# Patient Record
Sex: MALE | Race: BLACK OR AFRICAN AMERICAN | NOT HISPANIC OR LATINO | Employment: PART TIME | URBAN - METROPOLITAN AREA
[De-identification: names, ages, dates, MRNs, and addresses within clinical notes are randomized per-mention and may not be internally consistent; named-entity substitution may affect disease eponyms.]

---

## 2020-05-14 PROBLEM — F10.99 ALCOHOL USE, UNSPECIFIED WITH UNSPECIFIED ALCOHOL-INDUCED DISORDER: Status: ACTIVE | Noted: 2020-05-14

## 2020-05-14 PROBLEM — G89.29 CHRONIC LOW BACK PAIN: Status: ACTIVE | Noted: 2018-06-05

## 2020-05-14 PROBLEM — M54.2 PAIN OF CERVICAL SPINE: Status: ACTIVE | Noted: 2018-06-05

## 2020-05-14 PROBLEM — F15.90 STIMULANT USE DISORDER: Status: ACTIVE | Noted: 2020-05-14

## 2020-05-14 PROBLEM — R76.8 HCV ANTIBODY POSITIVE: Status: ACTIVE | Noted: 2020-05-14

## 2020-05-14 PROBLEM — M54.50 CHRONIC LOW BACK PAIN: Status: ACTIVE | Noted: 2018-06-05

## 2020-09-24 PROBLEM — Z72.0 TOBACCO ABUSE: Status: ACTIVE | Noted: 2020-09-24

## 2020-09-24 PROBLEM — F15.90 STIMULANT USE DISORDER: Status: RESOLVED | Noted: 2020-05-14 | Resolved: 2020-09-24

## 2020-09-24 PROBLEM — R73.03 PRE-DIABETES: Status: ACTIVE | Noted: 2020-09-24

## 2020-09-24 PROBLEM — E78.2 MIXED HYPERLIPIDEMIA: Status: ACTIVE | Noted: 2020-09-24

## 2021-02-23 PROBLEM — F43.10 PTSD (POST-TRAUMATIC STRESS DISORDER): Status: ACTIVE | Noted: 2021-02-23

## 2021-02-24 PROBLEM — F14.11 COCAINE ABUSE IN REMISSION: Status: ACTIVE | Noted: 2021-02-24

## 2021-02-24 PROBLEM — F10.21 ALCOHOL USE DISORDER, MODERATE, IN EARLY REMISSION: Status: ACTIVE | Noted: 2021-02-24

## 2021-03-18 PROBLEM — H61.21 IMPACTED CERUMEN OF RIGHT EAR: Status: ACTIVE | Noted: 2021-03-18

## 2021-03-18 PROBLEM — L21.0 DANDRUFF IN ADULT: Status: ACTIVE | Noted: 2021-03-18

## 2021-03-18 PROBLEM — R06.02 SOB (SHORTNESS OF BREATH) ON EXERTION: Status: ACTIVE | Noted: 2021-03-18

## 2021-03-18 PROBLEM — R21 RASH IN ADULT: Status: ACTIVE | Noted: 2021-03-18

## 2021-05-07 PROBLEM — J44.9 CHRONIC OBSTRUCTIVE PULMONARY DISEASE: Status: ACTIVE | Noted: 2021-05-07

## 2021-09-21 PROBLEM — Z71.6 TOBACCO ABUSE COUNSELING: Chronic | Status: ACTIVE | Noted: 2021-09-21

## 2021-09-21 PROBLEM — Z87.891 PERSONAL HISTORY OF TOBACCO USE, PRESENTING HAZARDS TO HEALTH: Chronic | Status: ACTIVE | Noted: 2021-09-21

## 2021-10-12 ENCOUNTER — CLINICAL SUPPORT (OUTPATIENT)
Dept: SMOKING CESSATION | Facility: CLINIC | Age: 61
End: 2021-10-12
Payer: COMMERCIAL

## 2021-10-12 DIAGNOSIS — F17.200 NICOTINE DEPENDENCE: Primary | ICD-10-CM

## 2021-10-12 PROCEDURE — 99404 PR PREVENT COUNSEL,INDIV,60 MIN: ICD-10-PCS | Mod: S$GLB,,, | Performed by: GENERAL PRACTICE

## 2021-10-12 PROCEDURE — 99404 PREV MED CNSL INDIV APPRX 60: CPT | Mod: S$GLB,,, | Performed by: GENERAL PRACTICE

## 2021-10-12 RX ORDER — DM/P-EPHED/ACETAMINOPH/DOXYLAM 30-7.5/3
2 LIQUID (ML) ORAL
Qty: 96 LOZENGE | Refills: 0 | Status: SHIPPED | OUTPATIENT
Start: 2021-10-12 | End: 2022-05-25

## 2021-10-12 RX ORDER — MICONAZOLE NITRATE 2 %
2 CREAM (GRAM) TOPICAL
Qty: 100 EACH | Refills: 0 | Status: SHIPPED | OUTPATIENT
Start: 2021-10-12 | End: 2022-05-25

## 2021-11-01 ENCOUNTER — CLINICAL SUPPORT (OUTPATIENT)
Dept: SMOKING CESSATION | Facility: CLINIC | Age: 61
End: 2021-11-01
Payer: COMMERCIAL

## 2021-11-01 DIAGNOSIS — F17.200 NICOTINE DEPENDENCE: Primary | ICD-10-CM

## 2021-11-01 PROCEDURE — 99402 PREV MED CNSL INDIV APPRX 30: CPT | Mod: S$GLB,,, | Performed by: GENERAL PRACTICE

## 2021-11-01 PROCEDURE — 99402 PR PREVENT COUNSEL,INDIV,30 MIN: ICD-10-PCS | Mod: S$GLB,,, | Performed by: GENERAL PRACTICE

## 2021-11-16 ENCOUNTER — TELEPHONE (OUTPATIENT)
Dept: SMOKING CESSATION | Facility: CLINIC | Age: 61
End: 2021-11-16
Payer: MEDICAID

## 2021-12-01 ENCOUNTER — TELEPHONE (OUTPATIENT)
Dept: SMOKING CESSATION | Facility: CLINIC | Age: 61
End: 2021-12-01
Payer: MEDICAID

## 2022-04-28 ENCOUNTER — CLINICAL SUPPORT (OUTPATIENT)
Dept: SMOKING CESSATION | Facility: CLINIC | Age: 62
End: 2022-04-28
Payer: COMMERCIAL

## 2022-04-28 DIAGNOSIS — F17.200 NICOTINE DEPENDENCE: Primary | ICD-10-CM

## 2022-04-28 PROCEDURE — 99407 PR TOBACCO USE CESSATION INTENSIVE >10 MINUTES: ICD-10-PCS | Mod: S$GLB,,,

## 2022-04-28 PROCEDURE — 99407 BEHAV CHNG SMOKING > 10 MIN: CPT | Mod: S$GLB,,,

## 2022-04-28 NOTE — PROGRESS NOTES
Spoke with patient today in regard to smoking cessation progress for 3/6 month telephone follow up, he states not tobacco free. Patient states having cut down a lot but not ready to return to the program at this time. Commended patient on the accomplishment thus far. Informed patient of benefit period, future follow up, and contact information if any further help or support is needed. Will complete smart form for 3 and 6 month follow up on Quit attempt #1.

## 2022-09-07 PROBLEM — Z72.0 TOBACCO USE: Status: ACTIVE | Noted: 2021-09-21

## 2022-09-07 PROBLEM — T14.8XXA ANIMAL BITE: Status: ACTIVE | Noted: 2022-09-07

## 2022-10-12 ENCOUNTER — CLINICAL SUPPORT (OUTPATIENT)
Dept: SMOKING CESSATION | Facility: CLINIC | Age: 62
End: 2022-10-12
Payer: COMMERCIAL

## 2022-10-12 DIAGNOSIS — F17.200 NICOTINE DEPENDENCE: Primary | ICD-10-CM

## 2022-10-12 PROCEDURE — 99407 PR TOBACCO USE CESSATION INTENSIVE >10 MINUTES: ICD-10-PCS | Mod: S$GLB,,,

## 2022-10-12 PROCEDURE — 99407 BEHAV CHNG SMOKING > 10 MIN: CPT | Mod: S$GLB,,,

## 2022-10-12 NOTE — PROGRESS NOTES
Spoke with patient today in regard to smoking cessation progress for 12 month telephone follow up, he states not tobacco free, but is smoking less. Patient states not interested in returning to the program at this time, but will reconsider in 2 months.  Informed patient of benefit period, future follow up, and contact information if any further help or support is needed.  Will complete smart form for 12 month follow up and resolve Quit attempt #1.

## 2023-10-16 PROBLEM — S40.212A ABRASION OF LEFT SHOULDER: Status: ACTIVE | Noted: 2023-01-01

## 2023-10-16 PROBLEM — Z72.0 TOBACCO ABUSE: Chronic | Status: ACTIVE | Noted: 2023-01-01

## 2023-10-16 PROBLEM — Z72.0 TOBACCO ABUSE: Status: ACTIVE | Noted: 2023-01-01

## 2023-10-16 PROBLEM — M15.9 PRIMARY OSTEOARTHRITIS INVOLVING MULTIPLE JOINTS: Status: ACTIVE | Noted: 2023-01-01

## 2023-10-16 PROBLEM — J43.9 EMPHYSEMA OF LUNG: Chronic | Status: ACTIVE | Noted: 2023-01-01

## 2023-10-16 PROBLEM — T79.7XXA SUBCUTANEOUS EMPHYSEMA DUE TO TRAUMA: Status: ACTIVE | Noted: 2023-01-01

## 2023-10-16 PROBLEM — I10 PRIMARY HYPERTENSION: Chronic | Status: ACTIVE | Noted: 2023-01-01

## 2023-10-16 PROBLEM — J43.9 EMPHYSEMA OF LUNG: Status: ACTIVE | Noted: 2023-01-01

## 2023-10-16 PROBLEM — R73.03 PREDIABETES: Status: ACTIVE | Noted: 2023-01-01

## 2023-10-16 PROBLEM — R73.03 PREDIABETES: Chronic | Status: ACTIVE | Noted: 2023-01-01

## 2023-10-16 PROBLEM — I10 PRIMARY HYPERTENSION: Status: ACTIVE | Noted: 2023-01-01

## 2023-10-16 PROBLEM — E78.2 MIXED HYPERLIPIDEMIA: Chronic | Status: ACTIVE | Noted: 2023-01-01

## 2023-10-16 PROBLEM — R04.0 EPISTAXIS DUE TO TRAUMA: Status: ACTIVE | Noted: 2023-01-01

## 2023-10-16 PROBLEM — I46.8: Status: ACTIVE | Noted: 2023-01-01

## 2023-10-16 PROBLEM — R40.2432 GLASGOW COMA SCALE SCORE 3-8, AT ARRIVAL TO EMERGENCY DEPARTMENT: Status: ACTIVE | Noted: 2023-01-01

## 2023-10-16 PROBLEM — Z98.890 HISTORY OF EYE SURGERY: Status: ACTIVE | Noted: 2023-01-01

## 2023-10-16 PROBLEM — S00.03XA TRAUMATIC HEMATOMA OF SCALP: Status: ACTIVE | Noted: 2023-01-01

## 2023-10-16 PROBLEM — I46.9 CARDIAC ARREST: Status: ACTIVE | Noted: 2023-01-01

## 2023-10-16 PROBLEM — S10.91XA ABRASION OF NECK: Status: ACTIVE | Noted: 2023-01-01

## 2023-10-16 PROBLEM — Z88.5 CODEINE ALLERGY: Chronic | Status: ACTIVE | Noted: 2023-01-01

## 2023-10-16 PROBLEM — E78.2 MIXED HYPERLIPIDEMIA: Status: ACTIVE | Noted: 2023-01-01

## 2023-10-16 PROBLEM — M15.9 PRIMARY OSTEOARTHRITIS INVOLVING MULTIPLE JOINTS: Chronic | Status: ACTIVE | Noted: 2023-01-01

## 2023-10-16 PROBLEM — H74.8X2 HEMOTYMPANUM, LEFT: Status: ACTIVE | Noted: 2023-01-01

## 2023-10-16 PROBLEM — Z98.890 HISTORY OF EYE SURGERY: Chronic | Status: ACTIVE | Noted: 2023-01-01

## 2023-10-16 PROBLEM — S06.9X9A CLOSED HEAD INJURY WITH LOSS OF CONSCIOUSNESS OF UNKNOWN DURATION: Status: ACTIVE | Noted: 2023-01-01
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
## 2023-10-16 PROBLEM — Z88.5 CODEINE ALLERGY: Status: ACTIVE | Noted: 2023-01-01
